# Patient Record
Sex: MALE | Race: WHITE | NOT HISPANIC OR LATINO | Employment: UNEMPLOYED | URBAN - METROPOLITAN AREA
[De-identification: names, ages, dates, MRNs, and addresses within clinical notes are randomized per-mention and may not be internally consistent; named-entity substitution may affect disease eponyms.]

---

## 2017-05-25 VITALS
BODY MASS INDEX: 22.44 KG/M2 | WEIGHT: 143 LBS | DIASTOLIC BLOOD PRESSURE: 66 MMHG | HEART RATE: 86 BPM | HEIGHT: 67 IN | OXYGEN SATURATION: 98 % | SYSTOLIC BLOOD PRESSURE: 120 MMHG

## 2017-10-04 ENCOUNTER — OFFICE VISIT (OUTPATIENT)
Dept: FAMILY MEDICINE | Facility: CLINIC | Age: 22
End: 2017-10-04
Payer: OTHER GOVERNMENT

## 2017-10-04 VITALS
WEIGHT: 137 LBS | DIASTOLIC BLOOD PRESSURE: 66 MMHG | SYSTOLIC BLOOD PRESSURE: 110 MMHG | OXYGEN SATURATION: 99 % | BODY MASS INDEX: 21.5 KG/M2 | HEIGHT: 67 IN | HEART RATE: 66 BPM

## 2017-10-04 DIAGNOSIS — Z00.00 HEALTHCARE MAINTENANCE: ICD-10-CM

## 2017-10-04 DIAGNOSIS — F41.8 DEPRESSION WITH ANXIETY: Primary | ICD-10-CM

## 2017-10-04 PROBLEM — Z78.9 NON-SMOKER: Status: ACTIVE | Noted: 2017-10-04

## 2017-10-04 PROCEDURE — 99213 OFFICE O/P EST LOW 20 MIN: CPT | Mod: ,,, | Performed by: NURSE PRACTITIONER

## 2017-10-04 RX ORDER — FLUOXETINE 10 MG/1
10 CAPSULE ORAL DAILY
Qty: 30 CAPSULE | Refills: 1 | Status: SHIPPED | OUTPATIENT
Start: 2017-10-04 | End: 2017-11-08 | Stop reason: SDUPTHER

## 2017-10-04 NOTE — PROGRESS NOTES
"Subjective:       Patient ID: Erickson Espana is a 22 y.o. male.    Chief Complaint: Depression (anger issues )    Erickson is here today for initial evaluation of depression with anger issues.  He does report that he has had problems with depression on and off for many years but he usually "snaps out of it" relatively quickly.  He feels that the depression and anger issues have been worsening over the last year.  He has not been treated in the past.      Mental Health Problem   The primary symptoms include dysphoric mood. The primary symptoms do not include delusions, hallucinations, bizarre behavior, disorganized speech, negative symptoms or somatic symptoms. The current episode started more than 1 month ago. This is a recurrent problem.   The degree of incapacity that he is experiencing as a consequence of his illness is mild. Additional symptoms of the illness include fatigue, agitation (anger issues) and distractible. Additional symptoms of the illness do not include no anhedonia, no insomnia, no hypersomnia, no appetite change, no unexpected weight change, no psychomotor retardation, no feelings of worthlessness, no attention impairment, no euphoric mood, no increased goal-directed activity, no flight of ideas, no inflated self-esteem, no decreased need for sleep, no poor judgment, no visual change, no headaches, no abdominal pain or no seizures. He does not admit to suicidal ideas. He does not have a plan to commit suicide. He does not contemplate harming himself. He has not already injured self. He does not contemplate injuring another person. He has not already  injured another person. Risk factors that are present for mental illness include a family history of mental illness.       Review of Systems   Constitutional: Positive for fatigue. Negative for activity change, appetite change, chills and unexpected weight change.   HENT: Negative for congestion, rhinorrhea and sore throat.    Eyes: Negative for pain and " visual disturbance.   Respiratory: Negative for cough and shortness of breath.    Cardiovascular: Negative for chest pain and palpitations.   Gastrointestinal: Negative for abdominal pain, constipation and diarrhea.   Endocrine: Negative for polydipsia, polyphagia and polyuria.   Genitourinary: Negative for dysuria, frequency and urgency.   Musculoskeletal: Negative for arthralgias, gait problem and myalgias.   Skin: Negative for color change, pallor and rash.   Allergic/Immunologic: Negative for immunocompromised state.   Neurological: Negative for dizziness, seizures, syncope, numbness and headaches.   Hematological: Negative for adenopathy.   Psychiatric/Behavioral: Positive for agitation (anger issues) and dysphoric mood. Negative for confusion, hallucinations, self-injury, sleep disturbance and suicidal ideas. The patient is nervous/anxious. The patient does not have insomnia.         History reviewed. No pertinent surgical history.    Family History   Problem Relation Age of Onset    Arthritis Mother     Depression Mother     Asthma Mother     Arthritis Father     Hypertension Father     Depression Father     Depression Sister     Asthma Sister         Social History     Social History    Marital status: Single     Spouse name: N/A    Number of children: N/A    Years of education: N/A     Occupational History    sales      Social History Main Topics    Smoking status: Never Smoker    Smokeless tobacco: Never Used    Alcohol use No    Drug use: No    Sexual activity: Not Asked     Other Topics Concern    None     Social History Narrative    Living situation: Lives with parents    Heat and Air: Central Air    Sleep: on average 8 hours nightly    Always uses a seatbelt    Type of Home: single family home       Current Outpatient Prescriptions   Medication Sig Dispense Refill    fluoxetine (PROZAC) 10 MG capsule Take 1 capsule (10 mg total) by mouth once daily. 30 capsule 1     No current  "facility-administered medications for this visit.        Review of patient's allergies indicates:   Allergen Reactions    Penicillins Anaphylaxis and Rash     Other reaction(s): Hives      Objective:   Blood pressure 110/66, pulse 66, height 5' 6.5" (1.689 m), weight 62.1 kg (137 lb), SpO2 99 %. Body mass index is 21.78 kg/m².       Physical Exam   Constitutional: He is oriented to person, place, and time. He appears well-developed and well-nourished. No distress.   HENT:   Head: Normocephalic and atraumatic.   Right Ear: External ear normal.   Left Ear: External ear normal.   Nose: Nose normal.   Mouth/Throat: Oropharynx is clear and moist.   Eyes: Conjunctivae, EOM and lids are normal. Pupils are equal, round, and reactive to light. Right eye exhibits no discharge. Left eye exhibits no discharge. No scleral icterus.   Neck: Normal range of motion. Neck supple. Carotid bruit is not present. No tracheal deviation present. No thyromegaly present.   Cardiovascular: Normal rate, regular rhythm, normal heart sounds and intact distal pulses.  Exam reveals no gallop and no friction rub.    No murmur heard.  Pulmonary/Chest: Effort normal and breath sounds normal. No respiratory distress. He has no wheezes. He has no rales.   Abdominal: Soft. Bowel sounds are normal. There is no tenderness. There is no guarding.   Musculoskeletal: Normal range of motion. He exhibits no edema or tenderness.   Lymphadenopathy:     He has no cervical adenopathy.   Neurological: He is alert and oriented to person, place, and time.   Skin: Skin is warm, dry and intact. Capillary refill takes less than 2 seconds. He is not diaphoretic.   Psychiatric: He has a normal mood and affect. His behavior is normal. Judgment and thought content normal. His mood appears not anxious. His affect is not inappropriate. His speech is not rapid and/or pressured, not delayed and not slurred. He is not agitated, not aggressive, not slowed, not withdrawn and not " actively hallucinating. Thought content is not paranoid and not delusional. He does not express impulsivity or inappropriate judgment. He expresses no homicidal and no suicidal ideation. He expresses no suicidal plans and no homicidal plans. He is attentive.        Assessment:       1. Depression with anxiety    2. Healthcare maintenance        Plan:       Erickson was seen today for depression.    Diagnoses and all orders for this visit:    Depression with anxiety  -     TSH; Future  -     Vitamin D; Future  -     Testosterone; Future  -     fluoxetine (PROZAC) 10 MG capsule; Take 1 capsule (10 mg total) by mouth once daily.   Side effects of new medication discussed with patient; including increased risk of suicide; understanding voiced.    Healthcare maintenance  -     CBC auto differential; Future  -     Comprehensive metabolic panel; Future  -     Lipid panel; Future  -     Urinalysis; Future

## 2017-10-04 NOTE — PATIENT INSTRUCTIONS
Anxiety Reaction  Anxiety is the feeling we all get when we think something bad might happen. It is a normal response to stress and usually causes only a mild reaction. When anxiety becomes more severe, it can interfere with daily life. In some cases, you may not even be aware of what it is youre anxious about. There may also be a genetic link or it may be a learned behavior in the home.  Both psychological and physical triggers cause stress reaction. It's often a response to fear or emotional stress, real or imagined. This stress may come from home, family, work, or social relationships.  During an anxiety reaction, you may feel:  · Helpless  · Nervous  · Depressed  · Irritable  Your body may show signs of anxiety in many ways. You may experience:  · Dry mouth  · Shakiness  · Dizziness  · Weakness  · Trouble breathing  · Breathing fast (hyperventilating)  · Chest pressure  · Sweating  · Headache  · Nausea  · Diarrhea  · Tiredness  · Inability to sleep  · Sexual problems  Home care  · Try to locate the sources of stress in your life. They may not be obvious. These may include:  ¨ Daily hassles of life (traffic jams, missed appointments, car troubles, etc.)  ¨ Major life changes, both good (new baby, job promotion) and bad (loss of job, loss of loved one)  ¨ Overload: feeling that you have too many responsibilities and can't take care of all of them at once  ¨ Feeling helpless, feeling that your problems are beyond what youre able to solve  · Notice how your body reacts to stress. Learn to listen to your body signals. This will help you take action before the stress becomes severe.  · When you can, do something about the source of your stress. (Avoid hassles, limit the amount of change that happens in your life at one time and take a break when you feel overloaded).  · Unfortunately, many stressful situations can't be avoided. It is necessary to learn how to better manage stress. There are many proven methods  that will reduce your anxiety. These include simple things like exercise, good nutrition and adequate rest. Also, there are certain techniques that are helpful:  ¨ Relaxation  ¨ Breathing exercises  ¨ Visualization  ¨ Biofeedback  ¨ Meditation  For more information about this, consult your doctor or go to a local bookstore and review the many books and tapes available on this subject.  Follow-up care  If you feel that your anxiety is not responding to self-help measures, contact your doctor or make an appointment with a counselor. You may need short-term psychological counseling and temporary medicine to help you manage stress.  Call 911  Call your healthcare provider right away if any of these occur:  · Trouble breathing  · Confusion  · Drowsiness or trouble wakening  · Fainting or loss of consciousness  · Rapid heart rate  · Seizure  · New chest pain that becomes more severe, lasts longer, or spreads into your shoulder, arm, neck, jaw, or back  When to seek medical advice  Call your healthcare provider right away if any of these occur:  · Your symptoms get worse  · Severe headache not relieved by rest and mild pain reliever  Date Last Reviewed: 9/29/2015  © 0197-6299 [employer]. [address] [place] [address]. All rights reserved. This information is not intended as a substitute for professional medical care. Always follow your healthcare professional's instructions.        Depression  Depression is one of the most common mental health problems today. It is not just a state of unhappiness or sadness. It is a true disease. The cause seems to be related to a decrease in chemicals that transmit signals in the brain. Having a family history of depression, alcoholism, or suicide increases the risk. Chronic illness, chronic pain, migraine headaches and high emotional stress also increase the risk.  Depression is something we tend to recognize in others, but may have a hard time seeing in  ourselves. It can show in many physical and emotional ways:  · Loss of appetite  · Over-eating  · Not being able to sleep  · Sleeping too much  · Tiredness not related to physical exertion  · Restlessness or irritability  · Slowness of movement or speech  · Feeling depressed or withdrawn  · Loss of interest in things you once enjoyed  · Trouble concentrating, poor memory, trouble making decisions  · Thoughts of harming or killing oneself, or thoughts that life is not worth living  · Low self-esteem  The treatment for depression may include both medicine and psychotherapy. Antidepressants can reduce suffering and can improve the ability to function during the depressed period. Therapy can offer emotional support and help you understand emotional factors that may be causing the depression.  Home care  · On-going care and support helps people manage this disease.  Find a healthcare provider and therapist who meet your needs. Seek help when you feel like you may be getting ill.  · Be kind to yourself. Make it a point to do things that you enjoy (gardening, walking in nature, going to a movie, etc.). Reward yourself for small successes.  · Take care of your physical body. Eat a balanced diet (low in saturated fat and high in fruits and vegetables). Exercise at least 3 times a week for 30 minutes. Even mild-moderate exercise (like brisk walking) can make you feel better.  · Avoid alcohol, which can make depression worse.  · Take medicine as prescribed.  · Tell each of your healthcare providers about all of the prescription drugs, over-the-counter medicines, vitamins, and supplements you take. Certain supplements interact with medicines and can result in dangerous side effects. Ask your pharmacist when you have questions about drug interactions.  · Talk with your family and trusted friends about your feelings and thoughts. Ask them to help you recognize behavior changes early so you can get help and, if needed, medicine  can be adjusted.  Follow-up care  Follow up with your healthcare provider, or as advised.  Call 911  Call 911 if you:  · Have suicidal thoughts, a suicide plan, and the means to carry out the plan  · Have trouble breathing  · Are very confused  · Feel very drowsy or have trouble awakening  · Faint or lose consciousness  · Have new chest pain that becomes more severe, lasts longer, or spreads into your shoulder, arm, neck, jaw or back  When to seek medical advice  Call your healthcare provider right away if any of these occur:  · Feeling extreme depression, fear, anxiety, or anger toward yourself or others  · Feeling out of control  · Feeling that you may try to harm yourself or another  · Hearing voices that others do not hear  · Seeing things that others do not see  · Cant sleep or eat for 3 days in a row  · Friends or family express concern over your behavior and ask you to seek help  Date Last Reviewed: 9/29/2015  © 3516-0842 [employer]. [address] [place] [address], [place] [address]. All rights reserved. This information is not intended as a substitute for professional medical care. Always follow your healthcare professional's instructions.

## 2017-10-07 LAB
25(OH)D3+25(OH)D2 SERPL-MCNC: 28.3 NG/ML (ref 30–100)
ALBUMIN SERPL-MCNC: 4.4 G/DL (ref 3.5–5.5)
ALBUMIN/GLOB SERPL: 1.8 {RATIO} (ref 1.2–2.2)
ALP SERPL-CCNC: 72 IU/L (ref 39–117)
ALT SERPL-CCNC: 14 IU/L (ref 0–44)
APPEARANCE UR: CLEAR
AST SERPL-CCNC: 20 IU/L (ref 0–40)
BASOPHILS # BLD AUTO: 0.1 X10E3/UL (ref 0–0.2)
BASOPHILS NFR BLD AUTO: 1 %
BILIRUB SERPL-MCNC: 0.8 MG/DL (ref 0–1.2)
BILIRUB UR QL STRIP: NEGATIVE
BUN SERPL-MCNC: 14 MG/DL (ref 6–20)
BUN/CREAT SERPL: 16 (ref 9–20)
CALCIUM SERPL-MCNC: 9.5 MG/DL (ref 8.7–10.2)
CHLORIDE SERPL-SCNC: 104 MMOL/L (ref 96–106)
CHOLEST SERPL-MCNC: 133 MG/DL (ref 100–199)
CO2 SERPL-SCNC: 25 MMOL/L (ref 18–29)
COLOR UR: YELLOW
CREAT SERPL-MCNC: 0.88 MG/DL (ref 0.76–1.27)
EOSINOPHIL # BLD AUTO: 0.1 X10E3/UL (ref 0–0.4)
EOSINOPHIL NFR BLD AUTO: 2 %
ERYTHROCYTE [DISTWIDTH] IN BLOOD BY AUTOMATED COUNT: 13.4 % (ref 12.3–15.4)
GLOBULIN SER CALC-MCNC: 2.4 G/DL (ref 1.5–4.5)
GLUCOSE SERPL-MCNC: 84 MG/DL (ref 65–99)
GLUCOSE UR QL: NEGATIVE
HCT VFR BLD AUTO: 46.4 % (ref 37.5–51)
HDLC SERPL-MCNC: 53 MG/DL
HGB BLD-MCNC: 15.2 G/DL (ref 12.6–17.7)
HGB UR QL STRIP: NEGATIVE
IMM GRANULOCYTES # BLD: 0 X10E3/UL (ref 0–0.1)
IMM GRANULOCYTES NFR BLD: 0 %
KETONES UR QL STRIP: NEGATIVE
LDLC SERPL CALC-MCNC: 70 MG/DL (ref 0–99)
LEUKOCYTE ESTERASE UR QL STRIP: NEGATIVE
LYMPHOCYTES # BLD AUTO: 0.9 X10E3/UL (ref 0.7–3.1)
LYMPHOCYTES NFR BLD AUTO: 21 %
MCH RBC QN AUTO: 30.2 PG (ref 26.6–33)
MCHC RBC AUTO-ENTMCNC: 32.8 G/DL (ref 31.5–35.7)
MCV RBC AUTO: 92 FL (ref 79–97)
MICRO URNS: NORMAL
MONOCYTES # BLD AUTO: 0.3 X10E3/UL (ref 0.1–0.9)
MONOCYTES NFR BLD AUTO: 7 %
NEUTROPHILS # BLD AUTO: 2.9 X10E3/UL (ref 1.4–7)
NEUTROPHILS NFR BLD AUTO: 69 %
NITRITE UR QL STRIP: NEGATIVE
PH UR STRIP: 7.5 [PH] (ref 5–7.5)
PLATELET # BLD AUTO: 232 X10E3/UL (ref 150–379)
POTASSIUM SERPL-SCNC: 4.8 MMOL/L (ref 3.5–5.2)
PROT SERPL-MCNC: 6.8 G/DL (ref 6–8.5)
PROT UR QL STRIP: NEGATIVE
RBC # BLD AUTO: 5.03 X10E6/UL (ref 4.14–5.8)
SODIUM SERPL-SCNC: 145 MMOL/L (ref 134–144)
SP GR UR: 1.02 (ref 1–1.03)
TESTOST SERPL-MCNC: 404 NG/DL (ref 264–916)
TRIGL SERPL-MCNC: 49 MG/DL (ref 0–149)
TSH SERPL DL<=0.005 MIU/L-ACNC: 1.19 UIU/ML (ref 0.45–4.5)
UROBILINOGEN UR STRIP-MCNC: 0.2 MG/DL (ref 0.2–1)
VLDLC SERPL CALC-MCNC: 10 MG/DL (ref 5–40)
WBC # BLD AUTO: 4.2 X10E3/UL (ref 3.4–10.8)

## 2017-10-11 ENCOUNTER — TELEPHONE (OUTPATIENT)
Dept: FAMILY MEDICINE | Facility: CLINIC | Age: 22
End: 2017-10-11

## 2017-11-08 ENCOUNTER — OFFICE VISIT (OUTPATIENT)
Dept: FAMILY MEDICINE | Facility: CLINIC | Age: 22
End: 2017-11-08
Payer: OTHER GOVERNMENT

## 2017-11-08 VITALS
HEART RATE: 91 BPM | DIASTOLIC BLOOD PRESSURE: 70 MMHG | HEIGHT: 66 IN | WEIGHT: 141 LBS | BODY MASS INDEX: 22.66 KG/M2 | OXYGEN SATURATION: 98 % | SYSTOLIC BLOOD PRESSURE: 130 MMHG

## 2017-11-08 DIAGNOSIS — F41.8 DEPRESSION WITH ANXIETY: Primary | ICD-10-CM

## 2017-11-08 PROCEDURE — 99213 OFFICE O/P EST LOW 20 MIN: CPT | Mod: ,,, | Performed by: NURSE PRACTITIONER

## 2017-11-08 RX ORDER — FLUOXETINE HYDROCHLORIDE 20 MG/1
20 CAPSULE ORAL DAILY
Qty: 30 CAPSULE | Refills: 3 | Status: SHIPPED | OUTPATIENT
Start: 2017-11-08 | End: 2018-11-08

## 2017-11-08 NOTE — PATIENT INSTRUCTIONS
Depression  Depression is one of the most common mental health problems today. It is not just a state of unhappiness or sadness. It is a true disease. The cause seems to be related to a decrease in chemicals that transmit signals in the brain. Having a family history of depression, alcoholism, or suicide increases the risk. Chronic illness, chronic pain, migraine headaches and high emotional stress also increase the risk.  Depression is something we tend to recognize in others, but may have a hard time seeing in ourselves. It can show in many physical and emotional ways:  · Loss of appetite  · Over-eating  · Not being able to sleep  · Sleeping too much  · Tiredness not related to physical exertion  · Restlessness or irritability  · Slowness of movement or speech  · Feeling depressed or withdrawn  · Loss of interest in things you once enjoyed  · Trouble concentrating, poor memory, trouble making decisions  · Thoughts of harming or killing oneself, or thoughts that life is not worth living  · Low self-esteem  The treatment for depression may include both medicine and psychotherapy. Antidepressants can reduce suffering and can improve the ability to function during the depressed period. Therapy can offer emotional support and help you understand emotional factors that may be causing the depression.  Home care  · On-going care and support helps people manage this disease.  Find a healthcare provider and therapist who meet your needs. Seek help when you feel like you may be getting ill.  · Be kind to yourself. Make it a point to do things that you enjoy (gardening, walking in nature, going to a movie, etc.). Reward yourself for small successes.  · Take care of your physical body. Eat a balanced diet (low in saturated fat and high in fruits and vegetables). Exercise at least 3 times a week for 30 minutes. Even mild-moderate exercise (like brisk walking) can make you feel better.  · Avoid alcohol, which can make  depression worse.  · Take medicine as prescribed.  · Tell each of your healthcare providers about all of the prescription drugs, over-the-counter medicines, vitamins, and supplements you take. Certain supplements interact with medicines and can result in dangerous side effects. Ask your pharmacist when you have questions about drug interactions.  · Talk with your family and trusted friends about your feelings and thoughts. Ask them to help you recognize behavior changes early so you can get help and, if needed, medicine can be adjusted.  Follow-up care  Follow up with your healthcare provider, or as advised.  Call 911  Call 911 if you:  · Have suicidal thoughts, a suicide plan, and the means to carry out the plan  · Have trouble breathing  · Are very confused  · Feel very drowsy or have trouble awakening  · Faint or lose consciousness  · Have new chest pain that becomes more severe, lasts longer, or spreads into your shoulder, arm, neck, jaw or back  When to seek medical advice  Call your healthcare provider right away if any of these occur:  · Feeling extreme depression, fear, anxiety, or anger toward yourself or others  · Feeling out of control  · Feeling that you may try to harm yourself or another  · Hearing voices that others do not hear  · Seeing things that others do not see  · Cant sleep or eat for 3 days in a row  · Friends or family express concern over your behavior and ask you to seek help  Date Last Reviewed: 9/29/2015  © 1893-9727 [employer]. [address] [place] [address], [place] [address]. All rights reserved. This information is not intended as a substitute for professional medical care. Always follow your healthcare professional's instructions.

## 2017-11-08 NOTE — PROGRESS NOTES
Subjective:       Patient ID: Erickson Espana is a 22 y.o. male.    Chief Complaint: Depression    Erickson is here today for follow up after starting prozac.  He states that he is a little better but his mood is not where he wants it to be.      Mental Health Problem   The primary symptoms include dysphoric mood. The primary symptoms do not include delusions, hallucinations, bizarre behavior, disorganized speech, negative symptoms or somatic symptoms. The current episode started more than 1 month ago.   The onset of the illness is precipitated by emotional stress. The degree of incapacity that he is experiencing as a consequence of his illness is moderate. Additional symptoms of the illness include anhedonia, fatigue, feelings of worthlessness, attention impairment and distractible. Additional symptoms of the illness do not include insomnia, hypersomnia, appetite change, unexpected weight change, agitation, psychomotor retardation, euphoric mood, increased goal-directed activity, flight of ideas, inflated self-esteem, decreased need for sleep, poor judgment, visual change, headaches, abdominal pain or seizures. He does not admit to suicidal ideas. He does not have a plan to commit suicide. He does not contemplate harming himself. He has not already injured self. He does not contemplate injuring another person. He has not already  injured another person.       Review of Systems   Constitutional: Positive for fatigue. Negative for activity change, appetite change, chills and unexpected weight change.   HENT: Negative for congestion, rhinorrhea and sore throat.    Eyes: Negative for pain and visual disturbance.   Respiratory: Negative for cough and shortness of breath.    Cardiovascular: Negative for chest pain and palpitations.   Gastrointestinal: Negative for abdominal pain, constipation and diarrhea.   Endocrine: Negative for polydipsia, polyphagia and polyuria.   Genitourinary: Negative for dysuria, frequency and  "urgency.   Musculoskeletal: Negative for arthralgias, gait problem and myalgias.   Skin: Negative for color change, pallor and rash.   Allergic/Immunologic: Negative for immunocompromised state.   Neurological: Negative for dizziness, seizures, syncope, numbness and headaches.   Hematological: Negative for adenopathy.   Psychiatric/Behavioral: Positive for dysphoric mood. Negative for agitation, confusion, hallucinations, self-injury and suicidal ideas. The patient does not have insomnia.         History reviewed. No pertinent surgical history.    Family History   Problem Relation Age of Onset    Arthritis Mother     Depression Mother     Asthma Mother     Arthritis Father     Hypertension Father     Depression Father     Depression Sister     Asthma Sister         Social History     Social History    Marital status: Single     Spouse name: N/A    Number of children: N/A    Years of education: N/A     Occupational History    sales      Social History Main Topics    Smoking status: Never Smoker    Smokeless tobacco: Never Used    Alcohol use No    Drug use: No    Sexual activity: Not Asked     Other Topics Concern    None     Social History Narrative    ** Merged History Encounter **         Living situation: Lives with parents  Heat and Air: Central Air  Sleep: on average 8 hours nightly  Always uses a seatbelt  Type of Home: single family home       Current Outpatient Prescriptions   Medication Sig Dispense Refill    FLUoxetine (PROZAC) 20 MG capsule Take 1 capsule (20 mg total) by mouth once daily. 30 capsule 3     No current facility-administered medications for this visit.        Review of patient's allergies indicates:   Allergen Reactions    Pcn [penicillins] Anaphylaxis    Penicillins Anaphylaxis and Rash     Other reaction(s): Hives      Objective:   Blood pressure 130/70, pulse 91, height 5' 5.5" (1.664 m), weight 64 kg (141 lb), SpO2 98 %. Body mass index is 23.11 kg/m².       Physical " Exam   Constitutional: He is oriented to person, place, and time. He appears well-developed and well-nourished. No distress.   HENT:   Head: Normocephalic and atraumatic.   Right Ear: External ear normal.   Left Ear: External ear normal.   Nose: Nose normal.   Mouth/Throat: Oropharynx is clear and moist.   Eyes: Conjunctivae and lids are normal. Pupils are equal, round, and reactive to light. No scleral icterus.   Neck: Normal range of motion. Neck supple. Carotid bruit is not present.   Cardiovascular: Normal rate, regular rhythm and normal heart sounds.    Pulmonary/Chest: Effort normal and breath sounds normal.   Abdominal: Soft. Bowel sounds are normal.   Musculoskeletal: Normal range of motion.   Neurological: He is alert and oriented to person, place, and time.   Skin: Skin is warm, dry and intact. He is not diaphoretic.   Psychiatric: He has a normal mood and affect. His behavior is normal. Judgment and thought content normal. His mood appears not anxious. His affect is not angry, not blunt, not labile and not inappropriate. He is not agitated and not combative. Thought content is not paranoid and not delusional. He does not express impulsivity. He does not exhibit a depressed mood. He expresses no homicidal and no suicidal ideation. He expresses no suicidal plans and no homicidal plans.        Assessment:       1. Depression with anxiety        Plan:       Erickson was seen today for depression.    Diagnoses and all orders for this visit:    Depression with anxiety   Increase prozac to 20 mg; follow up in 1 month if needed  -     FLUoxetine (PROZAC) 20 MG capsule; Take 1 capsule (20 mg total) by mouth once daily.

## 2019-12-09 ENCOUNTER — CLINICAL SUPPORT (OUTPATIENT)
Dept: URGENT CARE | Facility: CLINIC | Age: 24
End: 2019-12-09

## 2019-12-09 VITALS
SYSTOLIC BLOOD PRESSURE: 111 MMHG | DIASTOLIC BLOOD PRESSURE: 71 MMHG | OXYGEN SATURATION: 98 % | RESPIRATION RATE: 16 BRPM | BODY MASS INDEX: 22.45 KG/M2 | TEMPERATURE: 98 F | HEART RATE: 76 BPM | WEIGHT: 137 LBS

## 2019-12-09 DIAGNOSIS — J10.1 INFLUENZA B: Primary | ICD-10-CM

## 2019-12-09 DIAGNOSIS — J02.9 SORE THROAT: ICD-10-CM

## 2019-12-09 LAB
CTP QC/QA: YES
CTP QC/QA: YES
FLUAV AG NPH QL: NEGATIVE
FLUBV AG NPH QL: POSITIVE
S PYO RRNA THROAT QL PROBE: NEGATIVE

## 2019-12-09 PROCEDURE — 87804 INFLUENZA ASSAY W/OPTIC: CPT | Mod: QW,,, | Performed by: NURSE PRACTITIONER

## 2019-12-09 PROCEDURE — 87880 POCT RAPID STREP A: ICD-10-PCS | Mod: QW,,, | Performed by: NURSE PRACTITIONER

## 2019-12-09 PROCEDURE — 87880 STREP A ASSAY W/OPTIC: CPT | Mod: QW,,, | Performed by: NURSE PRACTITIONER

## 2019-12-09 PROCEDURE — 99204 OFFICE O/P NEW MOD 45 MIN: CPT | Mod: 25,S$GLB,, | Performed by: NURSE PRACTITIONER

## 2019-12-09 PROCEDURE — 99204 PR OFFICE/OUTPT VISIT, NEW, LEVL IV, 45-59 MIN: ICD-10-PCS | Mod: 25,S$GLB,, | Performed by: NURSE PRACTITIONER

## 2019-12-09 PROCEDURE — 87804 POCT INFLUENZA A/B: ICD-10-PCS | Mod: QW,,, | Performed by: NURSE PRACTITIONER

## 2019-12-09 RX ORDER — DEXAMETHASONE SODIUM PHOSPHATE 4 MG/ML
8 INJECTION, SOLUTION INTRA-ARTICULAR; INTRALESIONAL; INTRAMUSCULAR; INTRAVENOUS; SOFT TISSUE
Status: COMPLETED | OUTPATIENT
Start: 2019-12-09 | End: 2019-12-09

## 2019-12-09 RX ADMIN — DEXAMETHASONE SODIUM PHOSPHATE 8 MG: 4 INJECTION, SOLUTION INTRA-ARTICULAR; INTRALESIONAL; INTRAMUSCULAR; INTRAVENOUS; SOFT TISSUE at 05:12

## 2019-12-09 NOTE — PROGRESS NOTES
Subjective: sore throat, hurts to swallow, stuffy nose, cough       Patient ID: Erickson Espana is a 24 y.o. male.    Vitals:  weight is 62.1 kg (137 lb). His temperature is 98.3 °F (36.8 °C). His blood pressure is 111/71 and his pulse is 76. His respiration is 16 and oxygen saturation is 98%.     Chief Complaint: Sore Throat (hurts to swallow, stuffy nose, cough)    Erickson Espana is a 24 year old male presenting to the clinic with a 5 day history of sore throat, body aches, nasal congestion, slight cough. He has been taking dayquil, nyquil, and advil without relief of the symptoms. He is able to tolerate PO intake and has had no vomiting.     Sore Throat    This is a new problem. The current episode started in the past 7 days. Associated symptoms include congestion, coughing and trouble swallowing. Pertinent negatives include no diarrhea, headaches, shortness of breath or vomiting.       Constitution: Positive for appetite change. Negative for chills, fatigue and fever.   HENT: Positive for congestion, sore throat and trouble swallowing.    Neck: Negative for painful lymph nodes.   Cardiovascular: Negative for chest pain and leg swelling.   Eyes: Negative for double vision and blurred vision.   Respiratory: Positive for cough. Negative for shortness of breath.    Gastrointestinal: Negative for nausea, vomiting and diarrhea.   Genitourinary: Negative for dysuria, frequency and urgency.   Musculoskeletal: Negative for joint pain, joint swelling, muscle cramps and muscle ache.   Skin: Negative for color change, pale and rash.   Allergic/Immunologic: Negative for seasonal allergies.   Neurological: Negative for dizziness, history of vertigo, light-headedness, passing out and headaches.   Hematologic/Lymphatic: Negative for swollen lymph nodes, easy bruising/bleeding and history of blood clots. Does not bruise/bleed easily.   Psychiatric/Behavioral: Negative for nervous/anxious, sleep disturbance and depression. The  patient is not nervous/anxious.        Objective:      Physical Exam   Constitutional: He is oriented to person, place, and time. He appears well-developed and well-nourished. He is cooperative.  Non-toxic appearance. He does not appear ill. No distress.   HENT:   Head: Normocephalic and atraumatic.   Right Ear: Hearing, tympanic membrane, external ear and ear canal normal.   Left Ear: Hearing, tympanic membrane, external ear and ear canal normal.   Nose: Nose normal. No mucosal edema, rhinorrhea or nasal deformity. No epistaxis. Right sinus exhibits no maxillary sinus tenderness and no frontal sinus tenderness. Left sinus exhibits no maxillary sinus tenderness and no frontal sinus tenderness.   Mouth/Throat: Uvula is midline and mucous membranes are normal. No trismus in the jaw. Normal dentition. No uvula swelling. Posterior oropharyngeal erythema present. No oropharyngeal exudate. No tonsillar exudate.   Eyes: Conjunctivae and lids are normal. Right eye exhibits no discharge. Left eye exhibits no discharge. No scleral icterus.   Neck: Trachea normal, normal range of motion, full passive range of motion without pain and phonation normal. Neck supple.   Cardiovascular: Normal rate, regular rhythm, normal heart sounds, intact distal pulses and normal pulses.   Pulmonary/Chest: Effort normal and breath sounds normal. No respiratory distress.   Abdominal: Soft. Normal appearance and bowel sounds are normal. He exhibits no distension, no pulsatile midline mass and no mass. There is no tenderness.   Musculoskeletal: Normal range of motion. He exhibits no edema or deformity.   Neurological: He is alert and oriented to person, place, and time. He exhibits normal muscle tone. Coordination normal.   Skin: Skin is warm, dry, intact, not diaphoretic and not pale.   Psychiatric: He has a normal mood and affect. His speech is normal and behavior is normal. Judgment and thought content normal. Cognition and memory are normal.    Nursing note and vitals reviewed.        Assessment:       1. Influenza B    2. Sore throat        Plan:       The patient appears to have a viral upper respiratory infection with a viral syndrome.  Based upon the history and physical exam the patient does not appear to have a serious bacterial infection such as pneumonia, sepsis, otitis media, bacterial sinusitis, strep pharyngitis, parapharyngeal or peritonsillar abscess, meningitis.  I do not think the patient needs antibiotics as their illness likely has a viral etiology.  Patient appears very well and I have given specific return precautions to the patient and/or family members.  I have instructed the patient to hydrate, take over the counter medications and follow up with their regular doctor or the one provided.      Influenza B    Sore throat  -     POCT rapid strep A  -     POCT Influenza A/B    Other orders  -     dexamethasone injection 8 mg

## 2019-12-09 NOTE — PATIENT INSTRUCTIONS
The Flu (Influenza)     The virus that causes the flu spreads through the air in droplets when someone who has the flu coughs, sneezes, laughs, or talks.   The flu (influenza) is an infection that affects your respiratory tract. This tract is made up of your mouth, nose, and lungs, and the passages between them. Unlike a cold, the flu can make you very ill. And it can lead to pneumonia, a serious lung infection. The flu can have serious complications and even cause death.  Who is at risk for the flu?  Anyone can get the flu. But you are more likely to become infected if you:  · Have a weakened immune system  · Work in a healthcare setting where you may be exposed to flu germs  · Live or work with someone who has the flu  · Havent had an annual flu shot  How does the flu spread?  The flu is caused by a virus. The virus spreads through the air in droplets when someone who has the flu coughs, sneezes, laughs, or talks. You can become infected when you inhale these viruses directly. You can also become infected when you touch a surface on which the droplets have landed and then transfer the germs to your eyes, nose, or mouth. Touching used tissues, or sharing utensils, drinking glasses, or a toothbrush from an infected person can expose you to flu viruses, too.  What are the symptoms of the flu?  Flu symptoms tend to come on quickly and may last a few days to a few weeks. They include:  · Fever usually higher than 100.4°F  (38°C) and chills  · Sore throat and headache  · Dry cough  · Runny nose  · Tiredness and weakness  · Muscle aches  Who is at risk for flu complications?  For some people, the flu can be very serious. The risk for complications is greater for:  · Children younger than age 5  · Adults ages 65 and older  · People with a chronic illness such as diabetes or heart, kidney, or lung disease  · People who live in a nursing home or long-term care facility   How is the flu treated?  The flu usually gets  better after 7 days or so. In some cases, your healthcare provider may prescribe an antiviral medicine. This may help you get well a little sooner. For the medicine to help, you need to take it as soon as possible (ideally within 48 hours) after your symptoms start. If you develop pneumonia or other serious illness, you may need to stay in the hospital.  Easing flu symptoms  · Drink lots of fluids such as water, juice, and warm soup. A good rule is to drink enough so that you urinate your normal amount.  · Get plenty of rest.  · Ask your healthcare provider what to take for fever and pain.  · Call your provider if your fever is 100.4°F (38°C) or higher, or you become dizzy, lightheaded, or short of breath.  Taking steps to protect others  · Wash your hands often, especially after coughing or sneezing. Or clean your hands with an alcohol-based hand  containing at least 60% alcohol.  · Cough or sneeze into a tissue. Then throw the tissue away and wash your hands. If you dont have a tissue, cough and sneeze into your elbow.  · Stay home until at least 24 hours after you no longer have a fever or chills. Be sure the fever isnt being hidden by fever-reducing medicine.  · Dont share food, utensils, drinking glasses, or a toothbrush with others.  · Ask your healthcare provider if others in your household should get antiviral medicine to help them avoid infection.  How can the flu be prevented?  · One of the best ways to avoid the flu is to get a flu vaccine each year. The virus that causes the flu changes from year to year. For that reason, healthcare providers recommend getting the flu vaccine each year, as soon as it's available in your area. The vaccine is given as a shot. Your healthcare provider can tell you which vaccine is right for you. A nasal spray is also available but is not recommended for the 0912-6631 flu season. The CDC says this is because the nasal spray did not seem to protect against the flu  over the last several flu seasons. In the past, it was meant for people ages 2 to 49.  · Wash your hands often. Frequent handwashing is a proven way to help prevent infection.  · Carry an alcohol-based hand gel containing at least 60% alcohol. Use it when you can't use soap and water. Then wash your hands as soon as you can.  · Avoid touching your eyes, nose, and mouth.  · At home and work, clean phones, computer keyboards, and toys often with disinfectant wipes.  · If possible, avoid close contact with others who have the flu or symptoms of the flu.  Handwashing tips  Handwashing is one of the best ways to prevent many common infections. If you are caring for or visiting someone with the flu, wash your hands each time you enter and leave the room. Follow these steps:  · Use warm water and plenty of soap. Rub your hands together well.  · Clean the whole hand, including under your nails, between your fingers, and up the wrists.  · Wash for at least 15 seconds.  · Rinse, letting the water run down your fingers, not up your wrists.  · Dry your hands well. Use a paper towel to turn off the faucet and open the door.  Using alcohol-based hand   Alcohol-based hand  are also a good choice. Use them when you can't use soap and water. Follow these steps:  · Squeeze about a tablespoon of gel into the palm of one hand.  · Rub your hands together briskly, cleaning the backs of your hands, the palms, between your fingers, and up the wrists.  · Rub until the gel is gone and your hands are completely dry.  Preventing the flu in healthcare settings  The flu is a special concern for people in hospitals and long-term care facilities. To help prevent the spread of flu, many hospitals and nursing homes take these steps:  · Healthcare providers wash their hands or use an alcohol-based hand  before and after treating each patient.  · People with the flu have private rooms and bathrooms or share a room with someone  with the same infection.  · People who are at high risk for the flu but don't have it are encouraged to get the flu and pneumonia vaccines.  · All healthcare workers are encouraged or required to get flu shots.   Date Last Reviewed: 12/1/2016  © 8238-2302 [employer]. [address] [place] [id number]. All rights reserved. This information is not intended as a substitute for professional medical care. Always follow your healthcare professional's instructions.